# Patient Record
Sex: MALE | Race: WHITE | Employment: STUDENT | URBAN - METROPOLITAN AREA
[De-identification: names, ages, dates, MRNs, and addresses within clinical notes are randomized per-mention and may not be internally consistent; named-entity substitution may affect disease eponyms.]

---

## 2023-09-07 ENCOUNTER — OFFICE VISIT (OUTPATIENT)
Age: 23
End: 2023-09-07

## 2023-09-07 VITALS
OXYGEN SATURATION: 98 % | TEMPERATURE: 98.4 F | RESPIRATION RATE: 18 BRPM | HEART RATE: 91 BPM | WEIGHT: 154.2 LBS | SYSTOLIC BLOOD PRESSURE: 104 MMHG | DIASTOLIC BLOOD PRESSURE: 68 MMHG

## 2023-09-07 DIAGNOSIS — R51.9 ACUTE INTRACTABLE HEADACHE, UNSPECIFIED HEADACHE TYPE: ICD-10-CM

## 2023-09-07 DIAGNOSIS — K29.00 ACUTE GASTRITIS WITHOUT HEMORRHAGE, UNSPECIFIED GASTRITIS TYPE: Primary | ICD-10-CM

## 2023-09-07 DIAGNOSIS — K21.9 GASTROESOPHAGEAL REFLUX DISEASE WITHOUT ESOPHAGITIS: ICD-10-CM

## 2023-09-07 RX ORDER — DICLOFENAC SODIUM 75 MG/1
75 TABLET, DELAYED RELEASE ORAL 2 TIMES DAILY
Qty: 28 TABLET | Refills: 0 | Status: SHIPPED | OUTPATIENT
Start: 2023-09-07 | End: 2023-09-21

## 2023-09-07 RX ORDER — SUCRALFATE 1 G/1
1 TABLET ORAL 4 TIMES DAILY
Qty: 120 TABLET | Refills: 1 | Status: SHIPPED | OUTPATIENT
Start: 2023-09-07 | End: 2023-10-07

## 2023-09-07 RX ORDER — OMEPRAZOLE 40 MG/1
40 CAPSULE, DELAYED RELEASE ORAL
Qty: 30 CAPSULE | Refills: 2 | Status: SHIPPED | OUTPATIENT
Start: 2023-09-07 | End: 2023-10-07

## 2023-09-07 ASSESSMENT — ENCOUNTER SYMPTOMS
ABDOMINAL PAIN: 1
RESPIRATORY NEGATIVE: 1
EYES NEGATIVE: 1

## 2023-09-07 NOTE — PROGRESS NOTES
Veronica Vicente ( 2000) is a 24 y.o. male, New Patient patient, here for evaluation of the following chief complaint(s):  Abdominal Pain (4-5 days ago, eats anything severe pain starting on the lower and radiates to the left side (descending colon). Hx of gallbladder stones in family and pain occurs more when he eats something of high fat content. Headaches also occur and start from top of head to rest of body (tylenol helps). Throbbing pain. Took tylenol this AM (1,000mg))       Information provided by, or accompanied by:   Patient. ASSESSMENT/PLAN:  Yonathan Yu was seen today for abdominal pain. Diagnoses and all orders for this visit:    Acute gastritis without hemorrhage, unspecified gastritis type  -     omeprazole (PRILOSEC) 40 MG delayed release capsule; Take 1 capsule by mouth every morning (before breakfast)  -     sucralfate (CARAFATE) 1 GM tablet; Take 1 tablet by mouth 4 times daily  -     Tay Javier MD, Gastro[facility] [place] ([facility]    Gastroesophageal reflux disease without esophagitis  -     omeprazole (PRILOSEC) 40 MG delayed release capsule; Take 1 capsule by mouth every morning (before breakfast)  -     sucralfate (CARAFATE) 1 GM tablet; Take 1 tablet by mouth 4 times daily  -     Tay Javier MD, Gastroenterology, [facility])    Acute intractable headache, unspecified headache type  -     omeprazole (PRILOSEC) 40 MG delayed release capsule; Take 1 capsule by mouth every morning (before breakfast)  -     diclofenac (VOLTAREN) 75 MG EC tablet; Take 1 tablet by mouth 2 times daily for 14 days           Return in about 1 week (around 9/14/2023), or if symptoms worsen or fail to improve, for Abdominal pain. Francis Mendoza History provided by:  Patient   used: No    Abdominal Pain  The current episode started 1 to 4 weeks ago. The onset quality is sudden. The problem occurs daily. The problem has been gradually worsening.  The pain is located in

## 2023-12-12 ENCOUNTER — OFFICE VISIT (OUTPATIENT)
Age: 23
End: 2023-12-12

## 2023-12-12 VITALS
DIASTOLIC BLOOD PRESSURE: 78 MMHG | HEIGHT: 74 IN | BODY MASS INDEX: 19.07 KG/M2 | TEMPERATURE: 98.1 F | RESPIRATION RATE: 16 BRPM | OXYGEN SATURATION: 98 % | HEART RATE: 68 BPM | WEIGHT: 148.6 LBS | SYSTOLIC BLOOD PRESSURE: 124 MMHG

## 2023-12-12 DIAGNOSIS — J02.9 ACUTE SORE THROAT: Primary | ICD-10-CM

## 2023-12-12 LAB
GROUP A STREP ANTIGEN, POC: NEGATIVE
VALID INTERNAL CONTROL, POC: NORMAL

## 2023-12-12 RX ORDER — IBUPROFEN 200 MG
200 TABLET ORAL EVERY 6 HOURS PRN
COMMUNITY

## 2023-12-12 RX ORDER — ACETAMINOPHEN 325 MG/1
650 TABLET ORAL EVERY 6 HOURS PRN
COMMUNITY

## 2023-12-12 NOTE — PROGRESS NOTES
Radha Knox (:  2000) is a 24 y.o. male,Established patient, here for evaluation of the following chief complaint(s):  Pharyngitis (Sore throat for 5-6 days - blisters in mouth/throat - neck feels swollen/pressure)      ASSESSMENT/PLAN:  Visit Diagnoses and Associated Orders       Acute sore throat    -  Primary    AMB POC RAPID STREP A [51973 CPT(R)]           ORDERS WITHOUT AN ASSOCIATED DIAGNOSIS    acetaminophen (TYLENOL) 325 MG tablet [101]      ibuprofen (ADVIL;MOTRIN) 200 MG tablet [3841]            Results for orders placed or performed in visit on 23   AMB POC RAPID STREP A   Result Value Ref Range    Valid Internal Control, POC PASS     Group A Strep Antigen, POC Negative      Results for orders placed or performed in visit on 23   AMB POC RAPID STREP A   Result Value Ref Range    Valid Internal Control, POC PASS     Group A Strep Antigen, POC Negative       Thank you for choosing [facility] ! Your strep test was NEGATIVE,    Warm salt water gargles for sore throat. Use throat lozenges such as Cepacol or Chloraseptic spray for throat discomfort. Avoid citrus foods as these may irritate throat discomfort. Increase fluid intake to maintain hydration  OTC Mucinex if you start to experience cough to loosen secretions. Zinc 100 mg 2 times per day for 10 days. Vitamin C 500 mg 3 times oer day for 10 days. Vitamin D2 2000 IU daily for 10 days. Elderberry daily. Humidifier. Inhale hot steam from a shower or bath. OTC Flonase as needed. Follow up with your PCP if symptoms persist, worsen, you starting running a fever of 100.6 degrees F if not relieved by OTC OTC Tylenol, ibuprofen and/or Advil. If you start to experience chest pain and/or shortness of breath, seek emergency care or call 9--1. SUBJECTIVE/OBJECTIVE:  HPI  HPI:   24 y.o. male presents with symptoms of sore throat and mouth ulcers. Onset of symptoms approximately 5=6 days ago.  Patient reports that he has

## 2023-12-12 NOTE — PATIENT INSTRUCTIONS
Thank you for choosing [facility] ! Your strep test was NEGATIVE,    Warm salt water gargles for sore throat. Use throat lozenges such as Cepacol or Chloraseptic spray for throat discomfort. Avoid citrus foods as these may irritate throat discomfort. Increase fluid intake to maintain hydration  OTC Mucinex if you start to experience cough to loosen secretions. Zinc 100 mg 2 times per day for 10 days. Vitamin C 500 mg 3 times oer day for 10 days. Vitamin D2 2000 IU daily for 10 days. Elderberry daily. Humidifier. Inhale hot steam from a shower or bath. OTC Flonase as needed. Follow up with your PCP if symptoms persist, worsen, you starting running a fever of 100.6 degrees F if not relieved by OTC OTC Tylenol, ibuprofen and/or Advil. If you start to experience chest pain and/or shortness of breath, seek emergency care or call 9-1-1.

## 2023-12-13 DIAGNOSIS — R51.9 ACUTE INTRACTABLE HEADACHE, UNSPECIFIED HEADACHE TYPE: ICD-10-CM

## 2023-12-13 DIAGNOSIS — K29.00 ACUTE GASTRITIS WITHOUT HEMORRHAGE, UNSPECIFIED GASTRITIS TYPE: ICD-10-CM

## 2023-12-13 DIAGNOSIS — K21.9 GASTROESOPHAGEAL REFLUX DISEASE WITHOUT ESOPHAGITIS: ICD-10-CM

## 2024-01-30 RX ORDER — OMEPRAZOLE 40 MG/1
40 CAPSULE, DELAYED RELEASE ORAL
Qty: 30 CAPSULE | Refills: 2 | OUTPATIENT
Start: 2024-01-30